# Patient Record
Sex: FEMALE | Race: WHITE | Employment: FULL TIME | URBAN - NONMETROPOLITAN AREA
[De-identification: names, ages, dates, MRNs, and addresses within clinical notes are randomized per-mention and may not be internally consistent; named-entity substitution may affect disease eponyms.]

---

## 2021-09-07 ENCOUNTER — HOSPITAL ENCOUNTER (EMERGENCY)
Facility: HOSPITAL | Age: 51
Discharge: HOME | End: 2021-09-07
Attending: NURSE PRACTITIONER | Admitting: NURSE PRACTITIONER
Payer: COMMERCIAL

## 2021-09-07 VITALS
TEMPERATURE: 97.6 F | HEART RATE: 94 BPM | OXYGEN SATURATION: 97 % | RESPIRATION RATE: 16 BRPM | DIASTOLIC BLOOD PRESSURE: 85 MMHG | SYSTOLIC BLOOD PRESSURE: 136 MMHG

## 2021-09-07 DIAGNOSIS — J01.40 ACUTE PANSINUSITIS: Primary | ICD-10-CM

## 2021-09-07 DIAGNOSIS — J01.40 ACUTE PANSINUSITIS, RECURRENCE NOT SPECIFIED: ICD-10-CM

## 2021-09-07 PROCEDURE — 99213 OFFICE O/P EST LOW 20 MIN: CPT | Performed by: NURSE PRACTITIONER

## 2021-09-07 PROCEDURE — G0463 HOSPITAL OUTPT CLINIC VISIT: HCPCS

## 2021-09-07 ASSESSMENT — ENCOUNTER SYMPTOMS
FEVER: 0
HEADACHES: 1
SORE THROAT: 0
SINUS PRESSURE: 1
DIZZINESS: 1
SINUS PAIN: 1
CHILLS: 0
SHORTNESS OF BREATH: 0
COUGH: 0
TROUBLE SWALLOWING: 0

## 2021-09-07 NOTE — ED PROVIDER NOTES
History     Chief Complaint   Patient presents with     Sinusitis     past 2 days     HPI  Amaris Treadwell is a 51 year old female who presents to urgent care for concerns of a sinus infection.  Symptoms started 2 days ago.  Patient reports headache, dizziness, tooth pain, ear sensitivity and pain.  She tried some Claritin with minimal effectiveness.  Patient reports that she is immunosuppressed, currently being treated for kidney disease at [facility]. Ever since she started treatment for her kidney disease she notes increased sinus infections likely to weakened immune system.  Last sinus infection was in March 2021.  No fevers, chills, chest pain, shortness of breath or cough.    Allergies:  Allergies   Allergen Reactions     Latex      itches       Problem List:    There are no problems to display for this patient.       Past Medical History:    Past Medical History:   Diagnosis Date     Chronic kidney disease      Hypertension        Past Surgical History:    Past Surgical History:   Procedure Laterality Date     CHOLECYSTECTOMY         Family History:    No family history on file.    Social History:  Marital Status:   [2]  Social History     Tobacco Use     Smoking status: Former Smoker   Substance Use Topics     Alcohol use: No     Drug use: No        Medications:    amoxicillin-clavulanate (AUGMENTIN) 875-125 MG tablet  mycophenolate (CELLCEPT-BRAND NAME) 500 MG tablet  PREDNISONE PO  RAMIPRIL PO  TORSEMIDE PO  UNKNOWN TO PATIENT          Review of Systems   Constitutional: Negative for chills and fever.   HENT: Positive for ear pain, sinus pressure and sinus pain. Negative for ear discharge, sore throat and trouble swallowing.    Respiratory: Negative for cough and shortness of breath.    Neurological: Positive for dizziness and headaches.   All other systems reviewed and are negative.      Physical Exam   BP: 136/85  Pulse: 94  Temp: 97.6  F (36.4  C)  Resp: 16  SpO2: 97 %      Physical Exam  Vitals and  nursing note reviewed.   Constitutional:       Appearance: Normal appearance. She is ill-appearing. She is not toxic-appearing.   HENT:      Head: Normocephalic.      Right Ear: Tenderness present. A middle ear effusion is present. Tympanic membrane is not erythematous.      Left Ear: Tenderness present. A middle ear effusion is present. Tympanic membrane is not erythematous.      Nose: Congestion present.      Right Sinus: Maxillary sinus tenderness and frontal sinus tenderness present.      Left Sinus: Maxillary sinus tenderness and frontal sinus tenderness present.      Mouth/Throat:      Mouth: Mucous membranes are moist.      Pharynx: No oropharyngeal exudate or posterior oropharyngeal erythema.   Eyes:      Pupils: Pupils are equal, round, and reactive to light.   Cardiovascular:      Rate and Rhythm: Normal rate and regular rhythm.      Heart sounds: Normal heart sounds.   Pulmonary:      Effort: Pulmonary effort is normal. No respiratory distress.      Breath sounds: Normal breath sounds. No rhonchi or rales.   Musculoskeletal:         General: Normal range of motion.      Cervical back: Neck supple.   Skin:     General: Skin is warm and dry.      Capillary Refill: Capillary refill takes less than 2 seconds.   Neurological:      Mental Status: She is alert and oriented to person, place, and time.         ED Course        Procedures              No results found for this or any previous visit (from the past 24 hour(s)).    Medications - No data to display    Assessments & Plan (with Medical Decision Making)     I have reviewed the nursing notes.    51-year-old female that presented for evaluation of sinus infection symptoms x3 days.  Patient is ill-appearing but nontoxic.  Positive for bilateral middle ear effusion as well as bilateral frontal and maxillary tenderness to palpation.  Patient is immunocompromised.  Opted to treat for acute pansinusitis with Augmentin.  Recommended she continue taking Claritin  and pushing fluids.  Follow-up with PCP as needed.  Return to ED/UC for any concerning symptoms.  Patient voiced understanding.    I have reviewed the findings, diagnosis, plan and need for follow up with the patient.  This document was prepared using a combination of typing and voice generated software.  While every attempt was made for accuracy, spelling and grammatical errors may exist.    New Prescriptions    AMOXICILLIN-CLAVULANATE (AUGMENTIN) 875-125 MG TABLET    Take 1 tablet by mouth 2 times daily for 7 days       Final diagnoses:   Acute pansinusitis       9/7/2021   [facility]     Mpofu, Prudence, CNP  09/08/21 100

## 2021-09-07 NOTE — DISCHARGE INSTRUCTIONS
Take antibiotic as prescribed until finished.     Take an oral decongestant.    Follow up with your doctor if no improvement in symptoms.    Return to emergency department for worsening or concerning symptoms.